# Patient Record
Sex: MALE | Race: BLACK OR AFRICAN AMERICAN | NOT HISPANIC OR LATINO | Employment: STUDENT | URBAN - METROPOLITAN AREA
[De-identification: names, ages, dates, MRNs, and addresses within clinical notes are randomized per-mention and may not be internally consistent; named-entity substitution may affect disease eponyms.]

---
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
## 2025-01-10 NOTE — PROGRESS NOTES
Physical Therapy Daily Treatment Note  [address] [place] [address]    Patient: Vu Perez  : 2005  Referring Practitioner: Ananth Carlton, *  Date of Initial Visit: Type: THERAPY  Noted: 10/4/2022  Today's Date: 2022  Patient seen for 14 sessions    Visit Diagnoses:     ICD-10-CM ICD-9-CM   1. Anterior cruciate ligament disruption, left, subsequent encounter  S83.512D V58.89     996.49   2. Acute pain of left knee  M25.562 719.46   3. S/P ACL reconstruction  Z98.890 V45.89       Subjective   Vu Perez reports that he was okay following the last therapy session, no increase in pain or symptoms.  Pain Ratin    Objective     See Exercise, Manual, and Modality Logs for complete treatment.     Patient Education: exercise cueing, importance of following protocol for proper tissue healing time    Assessment & Plan     Assessment    Assessment details: Patient demonstrates increased extensor lag with each repetition of SLR. Patient required multiple verbal cueing to reduce, was not able to reduce. Patient did not note any discomfort or pain with exercises. Patient demonstrates compensation with side lying hip abduction and prone extension exercises, indicating glute weakness. Motivation and compliance with HEP is questionable.        Progress per Plan of Care and Progress strengthening /stabilization /functional activity    Timed:         Manual Therapy:         mins  15187;     Therapeutic Exercise:    24     mins  63786;     Neuromuscular Ladan:        mins  47757;    Therapeutic Activity:     16     mins  62853;     Gait Training:           mins  66317;     Ultrasound:          mins  46191;    Ionto                                   mins   97539  Self Care                            mins   02708  Canalith Repos                   mins  01236    Un-Timed:  Electrical Stimulation:         mins  28369 ( );  Dry Needling          mins   Traction           mins 14383    Timed Treatment:   40   mins   Total Treatment:     40   mins      Riana Bermudez PT  Physical Therapist  [place] License # [id number]   No

## 2025-01-28 ENCOUNTER — APPOINTMENT (OUTPATIENT)
Dept: GENERAL RADIOLOGY | Facility: HOSPITAL | Age: 20
End: 2025-01-28

## 2025-01-28 ENCOUNTER — HOSPITAL ENCOUNTER (EMERGENCY)
Facility: HOSPITAL | Age: 20
Discharge: HOME | End: 2025-01-28
Admitting: EMERGENCY MEDICINE

## 2025-01-28 VITALS
SYSTOLIC BLOOD PRESSURE: 134 MMHG | HEART RATE: 62 BPM | RESPIRATION RATE: 22 BRPM | OXYGEN SATURATION: 99 % | BODY MASS INDEX: 27.32 KG/M2 | TEMPERATURE: 97.8 F | HEIGHT: 73 IN | DIASTOLIC BLOOD PRESSURE: 72 MMHG | WEIGHT: 206.13 LBS

## 2025-01-28 DIAGNOSIS — M77.8 TENDINITIS OF RIGHT HAND: Primary | ICD-10-CM

## 2025-01-28 PROCEDURE — 73130 X-RAY EXAM OF HAND: CPT

## 2025-01-28 PROCEDURE — 97161 PT EVAL LOW COMPLEX 20 MIN: CPT | Performed by: PHYSICAL THERAPIST

## 2025-01-28 PROCEDURE — 99283 EMERGENCY DEPT VISIT LOW MDM: CPT

## 2025-01-28 RX ORDER — NAPROXEN 500 MG/1
500 TABLET ORAL 2 TIMES DAILY WITH MEALS
Qty: 28 TABLET | Refills: 0 | Status: SHIPPED | OUTPATIENT
Start: 2025-01-28

## 2025-01-28 NOTE — ED PROVIDER NOTES
Subjective   History of Present Illness  8-year-old male presents emergency room with complaints of right hand pain for 2 days.  Patient states that he uses his hand for lots of gripping at work.  Denies any falls or trauma.  Denies numbness tingling for extremity denies any previous injuries.  Patient has no history of gout        Review of Systems   Constitutional:  Negative for chills, fatigue and fever.   Musculoskeletal:  Positive for arthralgias.       Past Medical History:   Diagnosis Date    ACL (anterior cruciate ligament) tear        No Known Allergies    Past Surgical History:   Procedure Laterality Date    KNEE ACL RECONSTRUCTION Left 10/17/2022    Procedure: LEFT KNEE ARTHROSCOPY WITH MEDIAL  MENISCUS REPAIR AND  ANTERIOR CRUCIATE LIGAMENT RECONSTRUCTION WITH AUTOGRAFT;  Surgeon: Ananth Carlton MD;  Location: [facility];  Service: Orthopedics;  Laterality: Left;    KNEE ACL RECONSTRUCTION Left 7/5/2023    Procedure: KNEE arthroscopy with extra-articular tenodesis, meniscus repair;  Surgeon: Ananth Carlton MD;  Location: [facility];  Service: Orthopedics;  Laterality: Left;    STOMACH SURGERY      as infant       No family history on file.    Social History     Socioeconomic History    Marital status: Single   Tobacco Use    Smoking status: Never    Smokeless tobacco: Never   Vaping Use    Vaping status: Never Used   Substance and Sexual Activity    Alcohol use: Never    Drug use: Not Currently     Types: Marijuana    Sexual activity: Defer           Objective   Physical Exam  Vitals and nursing note reviewed.   Constitutional:       Appearance: Normal appearance.   Cardiovascular:      Rate and Rhythm: Normal rate and regular rhythm.   Pulmonary:      Effort: Pulmonary effort is normal.      Breath sounds: Normal breath sounds.   Musculoskeletal:      Right elbow: Normal.      Right forearm: Normal.      Right wrist: Normal.      Right hand: Swelling, tenderness and bony  "tenderness present. No deformity or lacerations. Decreased range of motion. Normal strength. Normal capillary refill. Normal pulse.      Comments: Patient with diffuse tenderness and swelling to the right third and fourth fingers.  No wrist or right elbow pain to range of motion or palpation.  No bruising or breaks in skin tissue or erythema noted.   Neurological:      Mental Status: He is alert.         Procedures           ED Course    /78 (BP Location: Left arm)   Pulse 56   Temp 97.4 °F (36.3 °C) (Oral)   Resp 22   Ht 185 cm (72.84\")   Wt 93.5 kg (206 lb 2.1 oz)   SpO2 99%   BMI 27.32 kg/m²   Labs Reviewed - No data to display  Medications - No data to display  XR Hand 3+ View Right    Result Date: 1/28/2025  No radiographic findings of acute osseous abnormality. Electronically Signed: Kush Temi  1/28/2025 9:55 AM EST  Workstation ID: [id number]                                                      Medical Decision Making  19-year-old male presents emergency room with complaints of right hand pain for approximately 2 days.  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  Patient has not falls injuries or trauma.  Denies ever previously having problems with his hand in the past.  Physical exam symmetric right hand right hand has diffuse tenderness and swelling to the right third and fourth fingers.  Patient assessed full range of motion however increase of flexion does cause pain.  Neurovascular right hand is intact with good capillary refill and Go test is negative.  Physical exam BP is 137/78 temperature 97.4 heart rate 56 respiration 22 SpO2 room air is 99%.  ER course x-rays of the right hand obtained found to be free of radiographic findings of acute osseous abnormality.  Independently interpreted by myself and Dr. Patel.  Agreed upon by the radiologist.  Physical therapist evaluated patient diagnosis of tendinitis was discussed " with patient given exercises per physical therapist.  Patient was given an anti-inflammatory note for work and advised to follow-up with Ortho if no better in 10 days.  Patient verbalized understand these instructions he was discharged home in stable condition.    Problems Addressed:  Tendinitis of right hand: complicated acute illness or injury    Amount and/or Complexity of Data Reviewed  Radiology: ordered.    Risk  Prescription drug management.        Final diagnoses:   Tendinitis of right hand       ED Disposition  ED Disposition       ED Disposition   Discharge    Condition   Stable    Comment   --               Ananth Carlton MD  [address] [place] [address] [place] [address] [phone]      As needed         Where to Get Your Medications        These medications were sent to [facility] [id number] - [place], [place] - [address] - [phone] PH - [phone] FX  [address] [place] [address]      Phone: [phone]   naproxen 500 MG tablet          Medication List      No changes were made to your prescriptions during this visit.            Dalton Houser PA-C  01/28/25 1030

## 2025-01-28 NOTE — THERAPY EVALUATION
Patient Name: Vu Perez  : 2005    MRN: [id number]                              Today's Date: 2025       Admit Date: 2025    Visit Dx:     ICD-10-CM ICD-9-CM   1. Tendinitis of right hand  M77.8 727.05     Patient Active Problem List   Diagnosis    Anterior cruciate ligament disruption, left, subsequent encounter     Past Medical History:   Diagnosis Date    ACL (anterior cruciate ligament) tear      Past Surgical History:   Procedure Laterality Date    KNEE ACL RECONSTRUCTION Left 10/17/2022    Procedure: LEFT KNEE ARTHROSCOPY WITH MEDIAL  MENISCUS REPAIR AND  ANTERIOR CRUCIATE LIGAMENT RECONSTRUCTION WITH AUTOGRAFT;  Surgeon: Ananth Carlton MD;  Location: [facility];  Service: Orthopedics;  Laterality: Left;    KNEE ACL RECONSTRUCTION Left 2023    Procedure: KNEE arthroscopy with extra-articular tenodesis, meniscus repair;  Surgeon: Ananth Carlton MD;  Location: [facility];  Service: Orthopedics;  Laterality: Left;    STOMACH SURGERY      as infant       SUBJECTIVE:    Pt with right 3rd and 4th digit pain , no 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   XR - negative     OBJECTIVE:    AROM -  WNL pain at end range DIP flex   PROM - WFL   MMT UE - pain flexor tendon at PIP 3rd and 4th digit, 4-/5  SPECIAL TESTING   Scaphoid test (anatomica lsnuff box tenderness) - negative    Finklesteins test (DeQuerveins test) - negative    Carpal tunnel test  - negative    Phalens, reverse phalens  - negative    Tinels test  - negative    Scaphoid shift test (scapholunate test) - negative    Nerve testing - median/ulnar/radial  - negative        PALPATION/TENDERNESS - present at 3rd and 4th DIP pad  SENSATION - intact to light touch       ASSESSMENT:   Pt presents with a diagnosis of flexor tendon sprain of 3rd and 4th digits and has pain and decreased mobility that are limiting his ability to perform ADLs. Discussed importance of tendon glides for  this area and added this to HEP. Pt with good understanding. Discussed using left hand for gripping at work if able. Pt declined OPPT at this time.      Goals:   LTG 1: The patient will be independent in HEP in order to decrease pain and improve tolerance to functional activities.  STATUS: Met    Interventions:   Manual Therapy: none    Therapeutic Exercises: tendon glides wrist /fingers     Modalities: none      PLAN:  home with HEP     Time Calculation:   PT Evaluation Complexity  History, PT Evaluation Complexity: 1-2 personal factors and/or comorbidities  Examination of Body Systems (PT Eval Complexity): 1-2 elements  Clinical Presentation (PT Evaluation Complexity): stable  Clinical Decision Making (PT Evaluation Complexity): low complexity  Overall Complexity (PT Evaluation Complexity): low complexity     PT Charges       Row Name 01/28/25 1216             Time Calculation    Start Time 1000  -AD      Stop Time 1020  -AD      Time Calculation (min) 20 min  -AD      PT Received On 01/28/25  -AD         Time Calculation- PT    Total Timed Code Minutes- PT 0 minute(s)  -AD                User Key  (r) = Recorded By, (t) = Taken By, (c) = Cosigned By      Initials Name Provider Type    AD Paola Vela, PT Physical Therapist                  Therapy Charges for Today       Code Description Service Date Service Provider Modifiers Qty    19074871378 HC PT EVAL LOW COMPLEXITY 4 1/28/2025 Paola Vela PT GP 1               PT Discharge Summary  Anticipated Discharge Disposition (PT): home    Paola Vela PT  1/28/2025

## 2025-01-28 NOTE — PLAN OF CARE
ASSESSMENT:   Pt presents with a diagnosis of flexor tendon sprain of 3rd and 4th digits and has pain and decreased mobility that are limiting his ability to perform ADLs. Discussed importance of tendon glides for this area and added this to HEP. Pt with good understanding. Discussed using left hand for gripping at work if able. Pt declined OPPT at this time.      Goals:   LTG 1: The patient will be independent in HEP in order to decrease pain and improve tolerance to functional activities.  STATUS: Met    Interventions:   Manual Therapy: none    Therapeutic Exercises: tendon glides wrist /fingers     Modalities: none      PLAN:  home with HEP

## 2025-01-28 NOTE — Clinical Note
[facility] EMERGENCY DEPARTMENT  [address] [facility] [place] [address]  Phone: [phone]    Vu Perez was seen and treated in our emergency department on 1/28/2025.  He may return to work on 01/29/2025.         Thank you for choosing [facility].    Dalton Houser, TIFFANI

## (undated) DEVICE — INTENDED FOR TISSUE SEPARATION, AND OTHER PROCEDURES THAT REQUIRE A SHARP SURGICAL BLADE TO PUNCTURE OR CUT.: Brand: BARD-PARKER ® CARBON RIB-BACK BLADES

## (undated) DEVICE — PK EXTREM 50

## (undated) DEVICE — DRSNG SURESITE WNDW 4X4.5

## (undated) DEVICE — 3M™ STERI-STRIP™ REINFORCED ADHESIVE SKIN CLOSURES, R1547, 1/2 IN X 4 IN (12 MM X 100 MM), 6 STRIPS/ENVELOPE: Brand: 3M™ STERI-STRIP™

## (undated) DEVICE — BUR RND COOL CUT 8FLUT 4MM 13CM

## (undated) DEVICE — PAD,ABDOMINAL,5"X9",STERILE,LF,1/PK: Brand: MEDLINE INDUSTRIES, INC.

## (undated) DEVICE — PAD UNDERCAST WYTEX 4IN 4YD LF STRL

## (undated) DEVICE — BNDG ESMARK 6INX9FT STRL

## (undated) DEVICE — TUBING, SUCTION, 1/4" X 12', STRAIGHT: Brand: MEDLINE

## (undated) DEVICE — PUSHER KNOT SUTR/CUT W/PORTAL SKID

## (undated) DEVICE — DRAPE,ORTHOMAX,ARTHRO T ,W/ POUCH: Brand: MEDLINE

## (undated) DEVICE — BNDG ELAS ELITE V/CLOSE 6IN 5YD LF STRL

## (undated) DEVICE — FLIPCUTTER 2 8MM STRL

## (undated) DEVICE — DRSNG TELFA PAD NONADH STR 1S 3X4IN

## (undated) DEVICE — SYR LL TP 10ML STRL

## (undated) DEVICE — CUFF TOURNI 1BLADDER 1PRT 30IN STRL

## (undated) DEVICE — GLV SURG SIGNATURE ESSENTIAL PF LTX SZ8

## (undated) DEVICE — SUT MNCRYL 3/0 Y936H

## (undated) DEVICE — UNDRGLV SURG BIOGEL PIMICROINDICATOR SYNTH SZ7.5PF STRL PR/2

## (undated) DEVICE — ACL GRAFT KNIFE 10MM

## (undated) DEVICE — COVER,MAYO STAND,STERILE: Brand: MEDLINE

## (undated) DEVICE — KT ACL TRANSTIB WO/ SAWBLD

## (undated) DEVICE — SOLUTION,WATER,IRRIGATION,1000ML,STERILE: Brand: MEDLINE

## (undated) DEVICE — BNDG ELAS ELITE V/CLOSE 4IN 5YD LF STRL

## (undated) DEVICE — UNDYED BRAIDED (POLYGLACTIN 910), SYNTHETIC ABSORBABLE SUTURE: Brand: COATED VICRYL

## (undated) DEVICE — GLV SURG SIGNATURE ESSENTIAL PF LTX SZ8.5

## (undated) DEVICE — SPNG GZ AVANT 6PLY 4X4IN STRL PK/2

## (undated) DEVICE — TBG ARTHSCP PT W CONN/REDUC 8FT

## (undated) DEVICE — 3M™ STERI-DRAPE™ U-DRAPE 1015: Brand: STERI-DRAPE™

## (undated) DEVICE — ADHS SKIN PREMIERPRO EXOFIN TOPICAL HI/VISC .5ML

## (undated) DEVICE — 3M™ IOBAN™ 2 ANTIMICROBIAL INCISE DRAPE 6650EZ: Brand: IOBAN™ 2

## (undated) DEVICE — GLV SURG SENSICARE PI ORTHO SZ7.5 LF STRL

## (undated) DEVICE — TP SXN YANKR BULB STRL

## (undated) DEVICE — GOWN,REINFORCE,POLY,SIRUS,BREATH SLV,XLG: Brand: MEDLINE

## (undated) DEVICE — SOL IRR NACL 0.9PCT ARTHROMATIC 3000ML

## (undated) DEVICE — GLV SURG SENSICARE PI PF LF 8.5 GRN STRL

## (undated) DEVICE — SUT VIC 2/0 CT2 27IN J269H

## (undated) DEVICE — BLD SHAVER EXCALIBUR CRV 4MM

## (undated) DEVICE — SOL IRRIG NACL 1000ML

## (undated) DEVICE — FLIPCUTTER 2 SHT  9.0MM STRL

## (undated) DEVICE — FMS FLUID MANAGEMENT SYSTEM 4.5MM FMS OUTFLOW CANNULA: Brand: FMS

## (undated) DEVICE — COVER,TABLE,44X90,STERILE: Brand: MEDLINE

## (undated) DEVICE — TBG ARTHSCP DUALWAVE

## (undated) DEVICE — MARKR SKIN W/RULR

## (undated) DEVICE — SUT ETHLN 3/0 FS1 30IN 669H

## (undated) DEVICE — SPNG LAP PREWSH SFTPK 18X18IN STRL PK/5

## (undated) DEVICE — WEBRIL COTTON UNDERCAST PADDING: Brand: WEBRIL

## (undated) DEVICE — C-ARM: Brand: UNBRANDED

## (undated) DEVICE — ABL ASP APOLLO RF XL 90D

## (undated) DEVICE — TBG ARTHSCP PUMP W CONN/REDUC 8FT

## (undated) DEVICE — COMP CRV FIBERSTITCH W/2 POLYSTR COMP/FW: Type: IMPLANTABLE DEVICE | Site: KNEE | Status: NON-FUNCTIONAL

## (undated) DEVICE — KT SURG TURNOVER 050

## (undated) DEVICE — NDL HYPO PRECISIONGLIDE/REG 18G 11/2 PNK